# Patient Record
Sex: MALE | Race: OTHER | Employment: FULL TIME | URBAN - METROPOLITAN AREA
[De-identification: names, ages, dates, MRNs, and addresses within clinical notes are randomized per-mention and may not be internally consistent; named-entity substitution may affect disease eponyms.]

---

## 2022-10-06 ENCOUNTER — HOSPITAL ENCOUNTER (OUTPATIENT)
Dept: CT IMAGING | Age: 52
Discharge: HOME | End: 2022-10-08
Payer: COMMERCIAL

## 2022-10-06 VITALS
DIASTOLIC BLOOD PRESSURE: 105 MMHG | HEART RATE: 57 BPM | SYSTOLIC BLOOD PRESSURE: 165 MMHG | RESPIRATION RATE: 20 BRPM | BODY MASS INDEX: 31.1 KG/M2 | TEMPERATURE: 97.6 F | OXYGEN SATURATION: 98 % | WEIGHT: 210 LBS | HEIGHT: 69 IN

## 2022-10-06 DIAGNOSIS — K76.0 FATTY LIVER: ICD-10-CM

## 2022-10-06 DIAGNOSIS — R79.89 ELEVATED LFTS: ICD-10-CM

## 2022-10-06 LAB
APTT: 25.2 SEC (ref 24.5–35.1)
BASOPHILS ABSOLUTE: 0.01 E9/L (ref 0–0.2)
BASOPHILS RELATIVE PERCENT: 0.1 % (ref 0–2)
EOSINOPHILS ABSOLUTE: 0.02 E9/L (ref 0.05–0.5)
EOSINOPHILS RELATIVE PERCENT: 0.2 % (ref 0–6)
HCT VFR BLD CALC: 40.5 % (ref 37–54)
HEMOGLOBIN: 13.9 G/DL (ref 12.5–16.5)
IMMATURE GRANULOCYTES #: 0.05 E9/L
IMMATURE GRANULOCYTES %: 0.6 % (ref 0–5)
INR BLD: 1.1
LYMPHOCYTES ABSOLUTE: 1.39 E9/L (ref 1.5–4)
LYMPHOCYTES RELATIVE PERCENT: 16.2 % (ref 20–42)
MCH RBC QN AUTO: 28.9 PG (ref 26–35)
MCHC RBC AUTO-ENTMCNC: 34.3 % (ref 32–34.5)
MCV RBC AUTO: 84.2 FL (ref 80–99.9)
MONOCYTES ABSOLUTE: 0.56 E9/L (ref 0.1–0.95)
MONOCYTES RELATIVE PERCENT: 6.5 % (ref 2–12)
NEUTROPHILS ABSOLUTE: 6.57 E9/L (ref 1.8–7.3)
NEUTROPHILS RELATIVE PERCENT: 76.4 % (ref 43–80)
PDW BLD-RTO: 13.3 FL (ref 11.5–15)
PLATELET # BLD: 391 E9/L (ref 130–450)
PMV BLD AUTO: 9 FL (ref 7–12)
PROTHROMBIN TIME: 12 SEC (ref 9.3–12.4)
RBC # BLD: 4.81 E12/L (ref 3.8–5.8)
WBC # BLD: 8.6 E9/L (ref 4.5–11.5)

## 2022-10-06 PROCEDURE — 47000 NEEDLE BIOPSY OF LIVER PERQ: CPT | Performed by: RADIOLOGY

## 2022-10-06 PROCEDURE — 6360000004 HC RX CONTRAST MEDICATION: Performed by: RADIOLOGY

## 2022-10-06 PROCEDURE — 2709999900 CT GUIDED NEEDLE PLACEMENT

## 2022-10-06 PROCEDURE — 7100000010 HC PHASE II RECOVERY - FIRST 15 MIN

## 2022-10-06 PROCEDURE — 36415 COLL VENOUS BLD VENIPUNCTURE: CPT

## 2022-10-06 PROCEDURE — 88313 SPECIAL STAINS GROUP 2: CPT

## 2022-10-06 PROCEDURE — 77012 CT SCAN FOR NEEDLE BIOPSY: CPT | Performed by: RADIOLOGY

## 2022-10-06 PROCEDURE — 85610 PROTHROMBIN TIME: CPT

## 2022-10-06 PROCEDURE — 6360000002 HC RX W HCPCS: Performed by: RADIOLOGY

## 2022-10-06 PROCEDURE — 88307 TISSUE EXAM BY PATHOLOGIST: CPT

## 2022-10-06 PROCEDURE — 7100000011 HC PHASE II RECOVERY - ADDTL 15 MIN

## 2022-10-06 PROCEDURE — 85730 THROMBOPLASTIN TIME PARTIAL: CPT

## 2022-10-06 PROCEDURE — 47000 NEEDLE BIOPSY OF LIVER PERQ: CPT

## 2022-10-06 PROCEDURE — 2500000003 HC RX 250 WO HCPCS: Performed by: RADIOLOGY

## 2022-10-06 PROCEDURE — 85025 COMPLETE CBC W/AUTO DIFF WBC: CPT

## 2022-10-06 RX ORDER — MIDAZOLAM HYDROCHLORIDE 2 MG/2ML
INJECTION, SOLUTION INTRAMUSCULAR; INTRAVENOUS
Status: COMPLETED | OUTPATIENT
Start: 2022-10-06 | End: 2022-10-06

## 2022-10-06 RX ORDER — LISINOPRIL AND HYDROCHLOROTHIAZIDE 12.5; 1 MG/1; MG/1
1 TABLET ORAL DAILY
COMMUNITY

## 2022-10-06 RX ORDER — LIDOCAINE HYDROCHLORIDE 20 MG/ML
INJECTION, SOLUTION INFILTRATION; PERINEURAL
Status: COMPLETED | OUTPATIENT
Start: 2022-10-06 | End: 2022-10-06

## 2022-10-06 RX ORDER — FENTANYL CITRATE 50 UG/ML
INJECTION, SOLUTION INTRAMUSCULAR; INTRAVENOUS
Status: COMPLETED | OUTPATIENT
Start: 2022-10-06 | End: 2022-10-06

## 2022-10-06 RX ORDER — SITAGLIPTIN AND METFORMIN HYDROCHLORIDE 1000; 50 MG/1; MG/1
1 TABLET, FILM COATED ORAL 2 TIMES DAILY
COMMUNITY

## 2022-10-06 RX ORDER — BUPROPION HYDROCHLORIDE 300 MG/1
300 TABLET ORAL EVERY MORNING
COMMUNITY

## 2022-10-06 RX ADMIN — LIDOCAINE HYDROCHLORIDE 5 ML: 20 INJECTION, SOLUTION INFILTRATION; PERINEURAL at 13:50

## 2022-10-06 RX ADMIN — Medication 1 KIT: at 13:54

## 2022-10-06 RX ADMIN — FENTANYL CITRATE 50 MCG: 50 INJECTION, SOLUTION INTRAMUSCULAR; INTRAVENOUS at 13:44

## 2022-10-06 RX ADMIN — MIDAZOLAM HYDROCHLORIDE 1 MG: 1 INJECTION, SOLUTION INTRAMUSCULAR; INTRAVENOUS at 13:43

## 2022-10-06 RX ADMIN — IOPAMIDOL 1 ML: 755 INJECTION, SOLUTION INTRAVENOUS at 15:29

## 2022-10-06 NOTE — OR NURSING
Pt to CT for image guided liver biopsy. Patient placed supine on CT table. Hooked to monitor. NS KVO.

## 2022-10-06 NOTE — PROGRESS NOTES
Patient transported to [facility] 3 bay. Verbal bedside report given to Zarina Ch RN upon the transfer of patient care. Biopsy site was checked. Bandage still dry and intact, no s/s o bleeding noted.  Biopsy samples brought down to lab No. [id number]

## 2022-10-06 NOTE — PROGRESS NOTES
Received and verbalized understanding of discharge instructions. Home with personal belongings. Ambulatory to car . Home with wife.

## 2022-10-06 NOTE — POST SEDATION
POST SEDATION NOTE:  Time: 11:03 AM    Cardiopulmonary: Vitals Signs Stable: yes    Level of Consciousness: alert    Reversal Agent Used: no    Complications: none    Follow-up/Observations: none    Pain Score: 1    Jian Peter MD

## 2022-10-06 NOTE — OR NURSING
DSD placed at biopsy site. No s/s of bleeding noted at this time. Patient has no complaints at this time.

## 2022-10-06 NOTE — PRE SEDATION
Lacy Reed II, MD  10/6/2022  11:04 AM        PRE-SEDATION PHYSICIAN ASSESSMENT:      1. HISTORY & PHYSICAL EXAMINATION:  Comments: none    Vitals:    10/06/22 0923   BP: 129/79   Pulse: 59   Resp: 20   Temp: 97.7 °F (36.5 °C)   SpO2: 96%       Allergies: Patient has no known allergies. 2. Heart and Lungs immediately prior to procedure demonstrate no contraindications to proceed      Chief Complaint: <principal problem not specified>    Drug: unknown  Tobacco: unknown    3. PAST ANESTHESIA EXPERIENCE:  unknown. 4. AIRWAY/TEETH/HEAD & NECK(Mallampati Classification):  II (soft palate, uvula, fauces visible)    5: NORMAL RANGE OF MOTION OF NECK: no    6. PATIENT WILL LIKELY TOLERATE PLAN OF MODERATE SEDATION    7. ASA 2.     Maira Sotelo MD

## 2025-04-07 ENCOUNTER — TRANSCRIBE ORDERS (OUTPATIENT)
Dept: ADMINISTRATIVE | Age: 55
End: 2025-04-07

## 2025-04-07 DIAGNOSIS — R74.01 ELEVATED LIVER TRANSAMINASE LEVEL: Primary | ICD-10-CM

## 2025-04-28 ENCOUNTER — HOSPITAL ENCOUNTER (OUTPATIENT)
Dept: ULTRASOUND IMAGING | Age: 55
Discharge: HOME | End: 2025-04-30
Payer: COMMERCIAL

## 2025-04-28 DIAGNOSIS — R74.01 ELEVATED LIVER TRANSAMINASE LEVEL: ICD-10-CM

## 2025-04-28 PROCEDURE — 76705 ECHO EXAM OF ABDOMEN: CPT

## 2025-06-06 ENCOUNTER — TRANSCRIBE ORDERS (OUTPATIENT)
Dept: ADMINISTRATIVE | Age: 55
End: 2025-06-06

## 2025-06-06 DIAGNOSIS — K76.89 LIVER DYSFUNCTION: Primary | ICD-10-CM

## 2025-06-26 ENCOUNTER — HOSPITAL ENCOUNTER (OUTPATIENT)
Dept: MRI IMAGING | Age: 55
Discharge: HOME | End: 2025-06-28
Payer: COMMERCIAL

## 2025-06-26 DIAGNOSIS — K76.89 LIVER DYSFUNCTION: ICD-10-CM

## 2025-06-26 PROCEDURE — 74183 MRI ABD W/O CNTR FLWD CNTR: CPT

## 2025-06-26 PROCEDURE — A9579 GAD-BASE MR CONTRAST NOS,1ML: HCPCS | Performed by: RADIOLOGY

## 2025-06-26 PROCEDURE — 6360000004 HC RX CONTRAST MEDICATION: Performed by: RADIOLOGY

## 2025-06-26 RX ORDER — GADOTERIDOL 279.3 MG/ML
20 INJECTION INTRAVENOUS
Status: COMPLETED | OUTPATIENT
Start: 2025-06-26 | End: 2025-06-26

## 2025-06-26 RX ADMIN — GADOTERIDOL 20 ML: 279.3 INJECTION, SOLUTION INTRAVENOUS at 09:01
